# Patient Record
Sex: MALE | Race: WHITE | NOT HISPANIC OR LATINO | URBAN - METROPOLITAN AREA
[De-identification: names, ages, dates, MRNs, and addresses within clinical notes are randomized per-mention and may not be internally consistent; named-entity substitution may affect disease eponyms.]

---

## 2023-05-15 ENCOUNTER — OFFICE VISIT (OUTPATIENT)
Dept: PRIMARY CARE | Facility: CLINIC | Age: 63
End: 2023-05-15
Payer: MEDICARE

## 2023-05-15 VITALS
RESPIRATION RATE: 16 BRPM | SYSTOLIC BLOOD PRESSURE: 110 MMHG | HEIGHT: 68 IN | TEMPERATURE: 98.2 F | BODY MASS INDEX: 34.4 KG/M2 | DIASTOLIC BLOOD PRESSURE: 73 MMHG | OXYGEN SATURATION: 91 % | HEART RATE: 97 BPM | WEIGHT: 227 LBS

## 2023-05-15 DIAGNOSIS — E53.8 VITAMIN B12 DEFICIENCY: ICD-10-CM

## 2023-05-15 DIAGNOSIS — E55.9 VITAMIN D DEFICIENCY: ICD-10-CM

## 2023-05-15 DIAGNOSIS — E66.9 OBESITY (BMI 30.0-34.9): ICD-10-CM

## 2023-05-15 DIAGNOSIS — N40.0 BENIGN PROSTATIC HYPERPLASIA, UNSPECIFIED WHETHER LOWER URINARY TRACT SYMPTOMS PRESENT: ICD-10-CM

## 2023-05-15 DIAGNOSIS — R10.31 RLQ ABDOMINAL PAIN: Primary | ICD-10-CM

## 2023-05-15 DIAGNOSIS — Z12.12 ENCOUNTER FOR COLORECTAL CANCER SCREENING: ICD-10-CM

## 2023-05-15 DIAGNOSIS — E78.6 LOW HDL (UNDER 40): ICD-10-CM

## 2023-05-15 DIAGNOSIS — Z12.11 ENCOUNTER FOR COLORECTAL CANCER SCREENING: ICD-10-CM

## 2023-05-15 DIAGNOSIS — R19.7 INTERMITTENT DIARRHEA: ICD-10-CM

## 2023-05-15 PROBLEM — E66.811 OBESITY (BMI 30.0-34.9): Status: ACTIVE | Noted: 2023-05-15

## 2023-05-15 PROCEDURE — 99204 OFFICE O/P NEW MOD 45 MIN: CPT | Performed by: FAMILY MEDICINE

## 2023-05-15 ASSESSMENT — ENCOUNTER SYMPTOMS
DIARRHEA: 1
OCCASIONAL FEELINGS OF UNSTEADINESS: 0
ABDOMINAL PAIN: 1
DEPRESSION: 0
LOSS OF SENSATION IN FEET: 0

## 2023-05-15 ASSESSMENT — ACTIVITIES OF DAILY LIVING (ADL)
DOING_HOUSEWORK: INDEPENDENT
MANAGING_FINANCES: INDEPENDENT
GROCERY_SHOPPING: INDEPENDENT
DRESSING: INDEPENDENT
BATHING: INDEPENDENT
TAKING_MEDICATION: INDEPENDENT

## 2023-05-15 ASSESSMENT — PATIENT HEALTH QUESTIONNAIRE - PHQ9
SUM OF ALL RESPONSES TO PHQ9 QUESTIONS 1 AND 2: 0
1. LITTLE INTEREST OR PLEASURE IN DOING THINGS: NOT AT ALL
2. FEELING DOWN, DEPRESSED OR HOPELESS: NOT AT ALL

## 2023-05-15 NOTE — PROGRESS NOTES
Subjective   Reason for Visit: Marcin Prescott is an 63 y.o. male here for a Medicare Wellness visit.          Reviewed all medications by prescribing practitioner or clinical pharmacist (such as prescriptions, OTCs, herbal therapies and supplements) and documented in the medical record.    HPI    Patient Self Assessment of Health Status  Patient Self Assessment: Good    Nutrition and Exercise  Current Diet: Well Balanced Diet  Adequate Fluid Intake: Yes  Caffeine: Yes  Exercise Frequency: Infrequently              No care team member to display     Review of Systems    Objective   Vitals:  /73   Pulse 97   Temp 36.8 °C (98.2 °F)   Resp 16   Wt 103 kg (227 lb)   SpO2 91%       Physical Exam    Assessment/Plan   Problem List Items Addressed This Visit    None

## 2023-05-15 NOTE — PROGRESS NOTES
Subjective   Patient ID: Marcin Prescott is a 63 y.o. male who presents for Abdominal Pain (Started yesterday and kept him up all night. ) and Diarrhea (Intermittent the last few months).    Abdominal Pain  Associated symptoms include diarrhea.   Diarrhea   Associated symptoms include abdominal pain.   Patient comes in today as a returning patient to the practice.  He has not been here in 5 years.  He comes in today complaining of intermittent diarrhea for the past few months and abdominal pain for 1 day.  He has also been constipated on and off every 1 to 2 days.  He states yesterday he had 2 bowel movements and one was like diarrhea.  This morning he had a bowel movement that was normal.  He denies blood in the stool.  Currently he is complaining of right lower quadrant pain which was sharper yesterday and last night but improved this morning after bowel movement.    He did have a colonoscopy 3/2/2020 by Dr. Jamey Cotto here in our building just before COVID.  He was supposed to have a repeat in 1 year due to multiple polyps, diverticulosis and nonbleeding internal hemorrhoids.  He never had it done because of COVID.    3/7/2018  Patient comes in today for checkup and refill of meds. He currently is without complaints. He states that he is taking his medicines as directed and is not having any side effects from them. He did have some issues when he was eating grapefruit and taking the statins with muscle aches. Since he stopped grapefruit he is doing fine.         9/18/2017  Patient comes in today complaining of urinary symptoms since Friday. He woke up Friday morning with trouble urinating. He had blood in his urine, urgency and frequency the rest the day on Friday and Saturday. Sunday it started to improve slightly. He comes in today for further evaluation. This is the first time he has had these symptoms ever.     2/2/2017  Patient comes in today for a physical exam. He hasn't been here since April 2009. 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 He is single and has no children. He has never been .    Family history: Dad  62-year-old diabetes and heart attack. Mom  stomach cancer 77-year-old. Patient has 4 brothers alive and well one with DM. Patient's brother Ion  liver cancer 53 yo.    Patient gets up once a night to urinate and denies blood in his stool or urine. He has never had a colonoscopy. His last lab work was \"16-17 years ago\".    Patient has had a \"rash on my tailbone for 6-8 months\". He hasn't tried anything to get rid of it.     Review of Systems   Gastrointestinal:  Positive for abdominal pain and diarrhea.   All other systems reviewed and are negative.      Objective   /73   Pulse 97   Temp 36.8 °C (98.2 °F)   Resp 16   Wt 103 kg (227 lb)   SpO2 91%     Physical Exam  Vitals reviewed.   Constitutional:       Appearance: He is well-developed. He is obese.   HENT:      Head: Normocephalic and atraumatic.      Right Ear: Tympanic membrane, ear canal and external ear normal.      Left Ear: Tympanic membrane, ear canal and external ear normal.      Nose: Nose normal.      Mouth/Throat:      Mouth: Mucous membranes are moist.      Pharynx: Oropharynx is clear.   Eyes:      Extraocular Movements: Extraocular movements intact.      Conjunctiva/sclera: Conjunctivae normal.      Pupils: Pupils are equal, round, and reactive to light.   Cardiovascular:      Rate and Rhythm: Normal rate and regular rhythm.      Heart sounds: Normal heart sounds. No murmur heard.  Pulmonary:      Effort: Pulmonary effort is normal.      Breath sounds: Normal breath sounds. No wheezing.   Abdominal:      General: Abdomen is flat. Bowel sounds are normal.      Palpations: Abdomen is soft.      Tenderness: There is abdominal tenderness (Mild tenderness right lower quadrant but patient is hungry).   Musculoskeletal:         General: Normal range of motion.   Skin:     General: Skin is warm and dry. "   Neurological:      General: No focal deficit present.      Mental Status: He is alert and oriented to person, place, and time. Mental status is at baseline.   Psychiatric:         Mood and Affect: Mood normal.         Behavior: Behavior normal.         Thought Content: Thought content normal.         Judgment: Judgment normal.         Assessment/Plan   Problem List Items Addressed This Visit       Obesity (BMI 30.0-34.9)    RLQ abdominal pain - Primary    Intermittent diarrhea    Relevant Orders    Comprehensive Metabolic Panel (Completed)     Other Visit Diagnoses       Encounter for colorectal cancer screening        Relevant Orders    Colonoscopy    Vitamin B12 deficiency        Relevant Orders    CBC (Completed)    Vitamin B12 (Completed)    Vitamin D deficiency        Relevant Orders    Vitamin D, Total (Completed)    Low HDL (under 40)        Relevant Orders    Lipid Panel (Completed)    Benign prostatic hyperplasia, unspecified whether lower urinary tract symptoms present        Relevant Orders    Prostate Specific Antigen (Completed)        Will contact patient with lab results when available.  Referral to GI for colonoscopy  Medication list reconciled.  Thank you for visiting today!      Professional services: Some of this note was completed using Dragon voice technology and sometimes the software misinterprets words. This may include unintended errors with respect to translation of words, typographical errors or grammar errors which may not have been identified prior to finalization of the chart note. Please take this into account when reading the note. Thank you.

## 2023-05-16 ENCOUNTER — LAB (OUTPATIENT)
Dept: LAB | Facility: LAB | Age: 63
End: 2023-05-16
Payer: MEDICARE

## 2023-05-16 DIAGNOSIS — E55.9 VITAMIN D DEFICIENCY: ICD-10-CM

## 2023-05-16 DIAGNOSIS — E53.8 VITAMIN B12 DEFICIENCY: ICD-10-CM

## 2023-05-16 DIAGNOSIS — N40.0 BENIGN PROSTATIC HYPERPLASIA, UNSPECIFIED WHETHER LOWER URINARY TRACT SYMPTOMS PRESENT: ICD-10-CM

## 2023-05-16 DIAGNOSIS — E78.6 LOW HDL (UNDER 40): ICD-10-CM

## 2023-05-16 DIAGNOSIS — R19.7 INTERMITTENT DIARRHEA: ICD-10-CM

## 2023-05-16 LAB
ALANINE AMINOTRANSFERASE (SGPT) (U/L) IN SER/PLAS: 23 U/L (ref 10–52)
ALBUMIN (G/DL) IN SER/PLAS: 4.2 G/DL (ref 3.4–5)
ALKALINE PHOSPHATASE (U/L) IN SER/PLAS: 78 U/L (ref 33–136)
ANION GAP IN SER/PLAS: 7 MMOL/L (ref 10–20)
ASPARTATE AMINOTRANSFERASE (SGOT) (U/L) IN SER/PLAS: 16 U/L (ref 9–39)
BILIRUBIN TOTAL (MG/DL) IN SER/PLAS: 0.9 MG/DL (ref 0–1.2)
CALCIDIOL (25 OH VITAMIN D3) (NG/ML) IN SER/PLAS: 13 NG/ML
CALCIUM (MG/DL) IN SER/PLAS: 9.5 MG/DL (ref 8.6–10.3)
CARBON DIOXIDE, TOTAL (MMOL/L) IN SER/PLAS: 26 MMOL/L (ref 21–32)
CHLORIDE (MMOL/L) IN SER/PLAS: 110 MMOL/L (ref 98–107)
CHOLESTEROL (MG/DL) IN SER/PLAS: 184 MG/DL (ref 0–199)
CHOLESTEROL IN HDL (MG/DL) IN SER/PLAS: 43 MG/DL
CHOLESTEROL/HDL RATIO: 4.3
COBALAMIN (VITAMIN B12) (PG/ML) IN SER/PLAS: 216 PG/ML (ref 211–911)
CREATININE (MG/DL) IN SER/PLAS: 1.03 MG/DL (ref 0.5–1.3)
ERYTHROCYTE DISTRIBUTION WIDTH (RATIO) BY AUTOMATED COUNT: 13.1 % (ref 11.5–14.5)
ERYTHROCYTE MEAN CORPUSCULAR HEMOGLOBIN CONCENTRATION (G/DL) BY AUTOMATED: 37.1 G/DL (ref 32–36)
ERYTHROCYTE MEAN CORPUSCULAR VOLUME (FL) BY AUTOMATED COUNT: 91 FL (ref 80–100)
ERYTHROCYTES (10*6/UL) IN BLOOD BY AUTOMATED COUNT: 4.87 X10E12/L (ref 4.5–5.9)
GFR MALE: 81 ML/MIN/1.73M2
GLUCOSE (MG/DL) IN SER/PLAS: 116 MG/DL (ref 74–99)
HEMATOCRIT (%) IN BLOOD BY AUTOMATED COUNT: 44.2 % (ref 41–52)
HEMOGLOBIN (G/DL) IN BLOOD: 16.4 G/DL (ref 13.5–17.5)
LDL: 114 MG/DL (ref 0–99)
LEUKOCYTES (10*3/UL) IN BLOOD BY AUTOMATED COUNT: 9.1 X10E9/L (ref 4.4–11.3)
PLATELETS (10*3/UL) IN BLOOD AUTOMATED COUNT: 235 X10E9/L (ref 150–450)
POTASSIUM (MMOL/L) IN SER/PLAS: 4.5 MMOL/L (ref 3.5–5.3)
PROSTATE SPECIFIC AG (NG/ML) IN SER/PLAS: 0.35 NG/ML (ref 0–4)
PROTEIN TOTAL: 7 G/DL (ref 6.4–8.2)
SODIUM (MMOL/L) IN SER/PLAS: 138 MMOL/L (ref 136–145)
TRIGLYCERIDE (MG/DL) IN SER/PLAS: 134 MG/DL (ref 0–149)
UREA NITROGEN (MG/DL) IN SER/PLAS: 21 MG/DL (ref 6–23)
VLDL: 27 MG/DL (ref 0–40)

## 2023-05-16 PROCEDURE — 80061 LIPID PANEL: CPT

## 2023-05-16 PROCEDURE — 82306 VITAMIN D 25 HYDROXY: CPT

## 2023-05-16 PROCEDURE — 85027 COMPLETE CBC AUTOMATED: CPT

## 2023-05-16 PROCEDURE — 84153 ASSAY OF PSA TOTAL: CPT

## 2023-05-16 PROCEDURE — 82607 VITAMIN B-12: CPT

## 2023-05-16 PROCEDURE — 36415 COLL VENOUS BLD VENIPUNCTURE: CPT

## 2023-05-16 PROCEDURE — 80053 COMPREHEN METABOLIC PANEL: CPT

## 2023-05-24 ENCOUNTER — TELEPHONE (OUTPATIENT)
Dept: PRIMARY CARE | Facility: CLINIC | Age: 63
End: 2023-05-24
Payer: MEDICARE

## 2023-05-24 DIAGNOSIS — E55.9 VITAMIN D DEFICIENCY: Primary | ICD-10-CM

## 2023-05-24 RX ORDER — ERGOCALCIFEROL 1.25 MG/1
CAPSULE ORAL
Qty: 16 CAPSULE | Refills: 2 | Status: SHIPPED | OUTPATIENT
Start: 2023-05-24 | End: 2023-06-20

## 2023-05-24 NOTE — TELEPHONE ENCOUNTER
----- Message from Yovanny Phan DO sent at 5/24/2023  7:10 AM EDT -----  Regarding: Labs  Please notify patient of very low vitamin D of 13.  It should be between 30 and 100 the closer to 50 the better. It is important vitamin for your heart, lungs, immune system and bones among other things in your body. Would recommend high potency vitamin D 50,000 units 4 times a week for the next 12 weeks and recheck in September 2023. Prescription sent to [facility] pharmacy in [place].    Cholesterol panel is trending upward.  Last cholesterol HDL ratio was 3.5 and now it is 4.3.  Any ratio 5.0 or higher is considered high risk for heart disease and stroke, ratio of 3.4 is ideal. Would recommend closely watching cholesterol in the diet and will recheck in September 2023.    Patient's B12 is also low at 216.  It should be above 400, when less than 400 one may have both neurological and/or psychological symptoms. Would recommend B-12 shots monthly for 4 months and recheck in September 2023. These can be self injected at home or set up in our office as a nurse visit.    All the rest of the labs are good, recheck in 1 year.  ----- Message -----  From: Lab, Background User  Sent: 5/16/2023  12:14 PM EDT  To: Yovanny Phan DO

## 2023-06-18 DIAGNOSIS — E55.9 VITAMIN D DEFICIENCY: ICD-10-CM

## 2023-06-20 RX ORDER — ERGOCALCIFEROL 1.25 MG/1
CAPSULE ORAL
Qty: 16 CAPSULE | Refills: 2 | Status: SHIPPED | OUTPATIENT
Start: 2023-06-20 | End: 2023-07-18

## 2023-07-17 DIAGNOSIS — E55.9 VITAMIN D DEFICIENCY: ICD-10-CM

## 2023-07-17 NOTE — TELEPHONE ENCOUNTER
Requested Prescriptions     Pending Prescriptions Disp Refills    ergocalciferol (Vitamin D-2) 1.25 MG (14825 UT) capsule [Pharmacy Med Name: VITAMIN D2 1.25MG(50,000 UNIT)] 16 capsule 2     Sig: TAKE 1 CAPSULE BY MOUTH 4 TIMES A WEEK FOR 12 WEEKS. NOT COVERED

## 2023-07-18 RX ORDER — ERGOCALCIFEROL 1.25 MG/1
CAPSULE ORAL
Qty: 16 CAPSULE | Refills: 2 | Status: SHIPPED | OUTPATIENT
Start: 2023-07-18

## 2024-05-09 ENCOUNTER — LAB (OUTPATIENT)
Dept: LAB | Facility: LAB | Age: 64
End: 2024-05-09
Payer: MEDICARE

## 2024-05-09 ENCOUNTER — OFFICE VISIT (OUTPATIENT)
Dept: PRIMARY CARE | Facility: CLINIC | Age: 64
End: 2024-05-09
Payer: MEDICARE

## 2024-05-09 VITALS
SYSTOLIC BLOOD PRESSURE: 140 MMHG | TEMPERATURE: 97.9 F | OXYGEN SATURATION: 97 % | RESPIRATION RATE: 20 BRPM | BODY MASS INDEX: 35.67 KG/M2 | WEIGHT: 234 LBS | HEART RATE: 68 BPM | DIASTOLIC BLOOD PRESSURE: 88 MMHG

## 2024-05-09 DIAGNOSIS — M25.472 LEFT ANKLE SWELLING: ICD-10-CM

## 2024-05-09 DIAGNOSIS — E78.6 LOW HDL (UNDER 40): ICD-10-CM

## 2024-05-09 DIAGNOSIS — E55.9 VITAMIN D DEFICIENCY: ICD-10-CM

## 2024-05-09 DIAGNOSIS — Z00.00 ENCOUNTER FOR HEALTH MAINTENANCE EXAMINATION: ICD-10-CM

## 2024-05-09 DIAGNOSIS — M79.89 SWELLING OF LEFT FOOT: ICD-10-CM

## 2024-05-09 DIAGNOSIS — Z83.3 FAMILY HISTORY OF DIABETES MELLITUS (DM): ICD-10-CM

## 2024-05-09 DIAGNOSIS — E53.8 VITAMIN B12 DEFICIENCY: ICD-10-CM

## 2024-05-09 DIAGNOSIS — N40.0 BENIGN PROSTATIC HYPERPLASIA, UNSPECIFIED WHETHER LOWER URINARY TRACT SYMPTOMS PRESENT: ICD-10-CM

## 2024-05-09 DIAGNOSIS — Z00.00 ENCOUNTER FOR HEALTH MAINTENANCE EXAMINATION: Primary | ICD-10-CM

## 2024-05-09 LAB
25(OH)D3 SERPL-MCNC: 27 NG/ML (ref 30–100)
ALBUMIN SERPL BCP-MCNC: 4.3 G/DL (ref 3.4–5)
ALP SERPL-CCNC: 74 U/L (ref 33–136)
ALT SERPL W P-5'-P-CCNC: 35 U/L (ref 10–52)
ANION GAP SERPL CALC-SCNC: 11 MMOL/L (ref 10–20)
AST SERPL W P-5'-P-CCNC: 24 U/L (ref 9–39)
BILIRUB SERPL-MCNC: 0.8 MG/DL (ref 0–1.2)
BUN SERPL-MCNC: 17 MG/DL (ref 6–23)
CALCIUM SERPL-MCNC: 9.4 MG/DL (ref 8.6–10.3)
CHLORIDE SERPL-SCNC: 108 MMOL/L (ref 98–107)
CHOLEST SERPL-MCNC: 192 MG/DL (ref 0–199)
CHOLESTEROL/HDL RATIO: 5.3
CO2 SERPL-SCNC: 25 MMOL/L (ref 21–32)
CREAT SERPL-MCNC: 0.92 MG/DL (ref 0.5–1.3)
EGFRCR SERPLBLD CKD-EPI 2021: >90 ML/MIN/1.73M*2
ERYTHROCYTE [DISTWIDTH] IN BLOOD BY AUTOMATED COUNT: 13.3 % (ref 11.5–14.5)
EST. AVERAGE GLUCOSE BLD GHB EST-MCNC: 114 MG/DL
GLUCOSE SERPL-MCNC: 107 MG/DL (ref 74–99)
HBA1C MFR BLD: 5.6 %
HCT VFR BLD AUTO: 46.7 % (ref 41–52)
HDLC SERPL-MCNC: 36.3 MG/DL
HGB BLD-MCNC: 15.7 G/DL (ref 13.5–17.5)
LDLC SERPL CALC-MCNC: 111 MG/DL
MCH RBC QN AUTO: 31.2 PG (ref 26–34)
MCHC RBC AUTO-ENTMCNC: 33.6 G/DL (ref 32–36)
MCV RBC AUTO: 93 FL (ref 80–100)
NON HDL CHOLESTEROL: 156 MG/DL (ref 0–149)
NRBC BLD-RTO: 0 /100 WBCS (ref 0–0)
PLATELET # BLD AUTO: 260 X10*3/UL (ref 150–450)
POTASSIUM SERPL-SCNC: 4.3 MMOL/L (ref 3.5–5.3)
PROT SERPL-MCNC: 7.1 G/DL (ref 6.4–8.2)
PSA SERPL-MCNC: 0.52 NG/ML
RBC # BLD AUTO: 5.04 X10*6/UL (ref 4.5–5.9)
SODIUM SERPL-SCNC: 140 MMOL/L (ref 136–145)
TRIGL SERPL-MCNC: 223 MG/DL (ref 0–149)
VIT B12 SERPL-MCNC: 305 PG/ML (ref 211–911)
VLDL: 45 MG/DL (ref 0–40)
WBC # BLD AUTO: 7.2 X10*3/UL (ref 4.4–11.3)

## 2024-05-09 PROCEDURE — 1170F FXNL STATUS ASSESSED: CPT | Performed by: FAMILY MEDICINE

## 2024-05-09 PROCEDURE — G0438 PPPS, INITIAL VISIT: HCPCS | Performed by: FAMILY MEDICINE

## 2024-05-09 PROCEDURE — 83036 HEMOGLOBIN GLYCOSYLATED A1C: CPT

## 2024-05-09 PROCEDURE — 1159F MED LIST DOCD IN RCRD: CPT | Performed by: FAMILY MEDICINE

## 2024-05-09 PROCEDURE — 80053 COMPREHEN METABOLIC PANEL: CPT

## 2024-05-09 PROCEDURE — 82607 VITAMIN B-12: CPT

## 2024-05-09 PROCEDURE — 85027 COMPLETE CBC AUTOMATED: CPT

## 2024-05-09 PROCEDURE — 1036F TOBACCO NON-USER: CPT | Performed by: FAMILY MEDICINE

## 2024-05-09 PROCEDURE — 36415 COLL VENOUS BLD VENIPUNCTURE: CPT

## 2024-05-09 PROCEDURE — 80061 LIPID PANEL: CPT

## 2024-05-09 PROCEDURE — 82306 VITAMIN D 25 HYDROXY: CPT

## 2024-05-09 PROCEDURE — 99214 OFFICE O/P EST MOD 30 MIN: CPT | Performed by: FAMILY MEDICINE

## 2024-05-09 PROCEDURE — 84153 ASSAY OF PSA TOTAL: CPT

## 2024-05-09 RX ORDER — CHOLECALCIFEROL (VITAMIN D3) 25 MCG
1000 TABLET ORAL DAILY
COMMUNITY

## 2024-05-09 ASSESSMENT — ACTIVITIES OF DAILY LIVING (ADL)
GROCERY_SHOPPING: INDEPENDENT
MANAGING_FINANCES: INDEPENDENT
TAKING_MEDICATION: INDEPENDENT
DOING_HOUSEWORK: INDEPENDENT
DRESSING: INDEPENDENT
BATHING: INDEPENDENT

## 2024-05-09 ASSESSMENT — PATIENT HEALTH QUESTIONNAIRE - PHQ9
1. LITTLE INTEREST OR PLEASURE IN DOING THINGS: SEVERAL DAYS
SUM OF ALL RESPONSES TO PHQ9 QUESTIONS 1 AND 2: 1
2. FEELING DOWN, DEPRESSED OR HOPELESS: NOT AT ALL
10. IF YOU CHECKED OFF ANY PROBLEMS, HOW DIFFICULT HAVE THESE PROBLEMS MADE IT FOR YOU TO DO YOUR WORK, TAKE CARE OF THINGS AT HOME, OR GET ALONG WITH OTHER PEOPLE: NOT DIFFICULT AT ALL

## 2024-05-09 ASSESSMENT — ENCOUNTER SYMPTOMS
LOSS OF SENSATION IN FEET: 0
DEPRESSION: 0
OCCASIONAL FEELINGS OF UNSTEADINESS: 0

## 2024-05-09 NOTE — PROGRESS NOTES
"Subjective   Reason for Visit: Marcin Prescott is an 64 y.o. male here for a Medicare Wellness visit.   Reviewed all medications by prescribing practitioner or clinical pharmacist (such as prescriptions, OTCs, herbal therapies and supplements) and documented in the medical record.    HPI  Patient comes in today for Medicare wellness exam.  He has a couple of issues to discuss.  He has been noticing some ankle swelling every day.  It seems to come by the end of the day but then is gone the next morning when he wakes up.  He did injure his left knee lifting about a month ago.  Today his left knee is good.    Patient also would like a \"black spot\" on his back evaluated.    Patient is still working, he is an automotive .    Patient's PHQ-9 score today was 2 which he rated not difficult at all.    Patient's ASCVD risk score today is 16.3%    Cardiovascular risks discussed and, if needed, lifestyle modifications recommended, including nutritional choices, exercise, and elimination of habits contributing to risk.  We agreed on a plan to reduce the current cardiovascular risk.     Aspirin use/disuse was discussed and documented in the Problem List of the medical record (under Cardiac Risk Counseling) after reviewing the updated guidelines below:   Consider low dose Aspirin ( mg) use if the benefit for cardiovascular disease prevention outweighs risk for bleeding complications.    In general, low dose ASA should be considered:   In patients WITHOUT prior MI/stroke/PAD (primary prevention):    a.          Age <60: Use if 10-year cardiovascular disease risk >20%, with                                       discussion of risks and benefits with patient   b.          Age 60-<70: Use if 10-year cardiovascular disease risk >20% and                                    low bleeding (e.g., gastrointestinal) risk, with discussion of risks                                  and benefits with patient   c.          " Age >=70: Do not use      In patients WITH prior MI/stroke/PAD (secondary prevention):    Generally use unless extremely high bleeding (e.g., gastrointestinal) risk,                    with discussion of risks and benefits with patient    Patient Care Team:  Yovanny Phan DO as PCP - General (Family Medicine)  Yovanny Phan DO as PCP [facility]     Review of Systems   All other systems reviewed and are negative.      Objective   Vitals:  /88   Pulse 68   Temp 36.6 °C (97.9 °F)   Resp 20   Wt 106 kg (234 lb)   SpO2 97%   BMI 35.67 kg/m²       Physical Exam  Vitals reviewed.   Constitutional:       Appearance: He is well-developed. He is obese.   HENT:      Head: Normocephalic and atraumatic.      Right Ear: Tympanic membrane, ear canal and external ear normal.      Left Ear: Tympanic membrane, ear canal and external ear normal.      Nose: Nose normal.      Mouth/Throat:      Mouth: Mucous membranes are moist.      Pharynx: Oropharynx is clear.   Eyes:      Extraocular Movements: Extraocular movements intact.      Conjunctiva/sclera: Conjunctivae normal.      Pupils: Pupils are equal, round, and reactive to light.   Cardiovascular:      Rate and Rhythm: Normal rate and regular rhythm.      Heart sounds: Normal heart sounds. No murmur heard.  Pulmonary:      Effort: Pulmonary effort is normal.      Breath sounds: Normal breath sounds. No wheezing.   Abdominal:      General: Abdomen is flat. Bowel sounds are normal.      Palpations: Abdomen is soft.   Musculoskeletal:         General: Normal range of motion.   Skin:     General: Skin is warm and dry.   Neurological:      General: No focal deficit present.      Mental Status: He is alert and oriented to person, place, and time. Mental status is at baseline.   Psychiatric:         Mood and Affect: Mood normal.         Behavior: Behavior normal.         Thought Content: Thought content normal.         Judgment: Judgment normal.        Assessment/Plan   Problem List Items Addressed This Visit    None  Visit Diagnoses       Low HDL (under 40)    -  Primary    Relevant Orders    Lipid Panel    Vitamin D deficiency        Relevant Orders    Vitamin D 25-Hydroxy,Total (for eval of Vitamin D levels)    Vitamin B12 deficiency        Relevant Orders    CBC    Vitamin B12    Benign prostatic hyperplasia, unspecified whether lower urinary tract symptoms present        Relevant Orders    Prostate Specific Antigen    Encounter for health maintenance examination        Relevant Orders    Comprehensive Metabolic Panel    Family history of diabetes mellitus (DM)        Relevant Orders    Hemoglobin A1C        Will contact patient with lab results when available.  Recommend trial of knee-high support hose.  Medication list reconciled.  Thank you for visiting today!      Professional services: Some of this note was completed using Dragon voice technology and sometimes the software misinterprets words. This may include unintended errors with respect to translation of words, typographical errors or grammar errors which may not have been identified prior to finalization of the chart note. Please take this into account when reading the note. Thank you.

## 2024-05-10 NOTE — RESULT ENCOUNTER NOTE
Chao Burch,    Your vitamin D level was low at 27.  It should be between 30 and 100 the closer to 50 the better. It is an important vitamin for your heart, lungs, immune system and bones among other things in your body. Would recommend over the counter vitamin D3 2000 units daily to get it into and keep it in the normal range and recheck in September 2024.     Your cholesterol/HDL ratio has gone up from 4.3 a year ago to 5.3 now.  Any ratio 5.0 or higher is considered high risk for heart disease and stroke, ratio of 3.4 is ideal. Would recommend closely watching cholesterol and fats in the diet and will recheck in September 2024. If not improved at that time we may need to start medication.    Your B12 level was low normal at 305.  It should be above 400, when less than 400 one may have both neurological and/or psychological symptoms. Would recommend B-12 shots monthly for 3 months and recheck in September 2024. These can be self injected at home or set up in our office as a nurse visit. Let me know which you would like to do.    All the rest of your labs were good.  Recheck them again in 1 year.    Have a Great Day and Weekend!!!    Dr. Phan

## 2024-06-28 ENCOUNTER — APPOINTMENT (OUTPATIENT)
Dept: PRIMARY CARE | Facility: CLINIC | Age: 64
End: 2024-06-28
Payer: MEDICARE

## 2024-06-28 VITALS
TEMPERATURE: 98 F | WEIGHT: 231 LBS | SYSTOLIC BLOOD PRESSURE: 130 MMHG | HEART RATE: 67 BPM | DIASTOLIC BLOOD PRESSURE: 77 MMHG | RESPIRATION RATE: 16 BRPM | BODY MASS INDEX: 35.21 KG/M2

## 2024-06-28 DIAGNOSIS — E55.9 VITAMIN D DEFICIENCY: ICD-10-CM

## 2024-06-28 DIAGNOSIS — E53.8 VITAMIN B12 DEFICIENCY: Primary | ICD-10-CM

## 2024-06-28 DIAGNOSIS — E78.6 LOW HDL (UNDER 40): ICD-10-CM

## 2024-06-28 PROCEDURE — 99213 OFFICE O/P EST LOW 20 MIN: CPT | Performed by: FAMILY MEDICINE

## 2024-06-28 PROCEDURE — 96372 THER/PROPH/DIAG INJ SC/IM: CPT | Performed by: FAMILY MEDICINE

## 2024-06-28 PROCEDURE — 1036F TOBACCO NON-USER: CPT | Performed by: FAMILY MEDICINE

## 2024-06-28 PROCEDURE — 1159F MED LIST DOCD IN RCRD: CPT | Performed by: FAMILY MEDICINE

## 2024-06-28 RX ORDER — CYANOCOBALAMIN 1000 UG/ML
1000 INJECTION, SOLUTION INTRAMUSCULAR; SUBCUTANEOUS ONCE
Status: COMPLETED | OUTPATIENT
Start: 2024-06-28 | End: 2024-06-28

## 2024-06-28 NOTE — PROGRESS NOTES
Subjective   Patient ID: Marcin Prescott is a 64 y.o. male who presents for B12 Injection (Discuss vitamin b12 injections).  HPI  Patient presents today for 1-month checkup and refill of meds. He currently is without complaints. He states that he is taking his medicines as directed and is not having any side effects from them.    He had lab work done last month which showed deficiencies in his vitamin D, B12 and also abnormal lipid panel.  He states he is taking the vitamin D replacement and has been watching the cholesterol and fats in his diet.  He is here today for his first of 3 B12 shots.    Review of Systems   All other systems reviewed and are negative.      Objective   Vitals:  /77   Pulse 67   Temp 36.7 °C (98 °F)   Resp 16   Wt 105 kg (231 lb)   BMI 35.21 kg/m²     Physical Exam  Vitals reviewed.   Constitutional:       Appearance: He is well-developed.   HENT:      Head: Normocephalic and atraumatic.      Right Ear: Tympanic membrane, ear canal and external ear normal.      Left Ear: Tympanic membrane, ear canal and external ear normal.      Nose: Nose normal.      Mouth/Throat:      Mouth: Mucous membranes are moist.      Pharynx: Oropharynx is clear.   Eyes:      Extraocular Movements: Extraocular movements intact.      Conjunctiva/sclera: Conjunctivae normal.      Pupils: Pupils are equal, round, and reactive to light.   Cardiovascular:      Rate and Rhythm: Normal rate and regular rhythm.      Heart sounds: Normal heart sounds. No murmur heard.  Pulmonary:      Effort: Pulmonary effort is normal.      Breath sounds: Normal breath sounds. No wheezing.   Abdominal:      General: Abdomen is flat. Bowel sounds are normal.      Palpations: Abdomen is soft.   Musculoskeletal:         General: Normal range of motion.   Skin:     General: Skin is warm and dry.   Neurological:      General: No focal deficit present.      Mental Status: He is alert and oriented to person, place, and time. Mental status  is at baseline.   Psychiatric:         Mood and Affect: Mood normal.         Behavior: Behavior normal.         Thought Content: Thought content normal.         Judgment: Judgment normal.       Assessment/Plan   Problem List Items Addressed This Visit    None  Visit Diagnoses       Vitamin B12 deficiency    -  Primary    Relevant Medications    cyanocobalamin (Vitamin B-12) injection 1,000 mcg (Start on 6/28/2024  9:00 AM)    Vitamin D deficiency        Low HDL (under 40)            Return to clinic monthly for B12 shots for 2 more months.  Recheck labs in September  Medication list reconciled.  Thank you for visiting today!      Professional services: Some of this note was completed using Dragon voice technology and sometimes the software misinterprets words. This may include unintended errors with respect to translation of words, typographical errors or grammar errors which may not have been identified prior to finalization of the chart note. Please take this into account when reading the note. Thank you.

## 2024-07-29 ENCOUNTER — APPOINTMENT (OUTPATIENT)
Dept: PRIMARY CARE | Facility: CLINIC | Age: 64
End: 2024-07-29
Payer: MEDICARE

## 2024-07-29 DIAGNOSIS — E53.8 VITAMIN B12 DEFICIENCY: ICD-10-CM

## 2024-07-29 PROCEDURE — 96372 THER/PROPH/DIAG INJ SC/IM: CPT | Performed by: FAMILY MEDICINE

## 2024-07-29 RX ORDER — CYANOCOBALAMIN 1000 UG/ML
1000 INJECTION, SOLUTION INTRAMUSCULAR; SUBCUTANEOUS ONCE
Status: COMPLETED | OUTPATIENT
Start: 2024-07-29 | End: 2024-07-29

## 2024-07-29 NOTE — PROGRESS NOTES
Marcin Prescott presented in office for 2/3 b12 nurse visit. Pt tolerated well, no side effects. Overseeing provider was Dr. Phan  .

## 2024-08-29 ENCOUNTER — APPOINTMENT (OUTPATIENT)
Dept: PRIMARY CARE | Facility: CLINIC | Age: 64
End: 2024-08-29
Payer: MEDICARE

## 2024-08-29 DIAGNOSIS — E55.9 VITAMIN D DEFICIENCY: ICD-10-CM

## 2024-08-29 DIAGNOSIS — E53.8 VITAMIN B12 DEFICIENCY: ICD-10-CM

## 2024-08-29 DIAGNOSIS — E78.6 LOW HDL (UNDER 40): ICD-10-CM

## 2024-08-29 RX ORDER — CYANOCOBALAMIN 1000 UG/ML
1000 INJECTION, SOLUTION INTRAMUSCULAR; SUBCUTANEOUS ONCE
Status: SHIPPED | OUTPATIENT
Start: 2024-08-29

## 2024-08-29 NOTE — PROGRESS NOTES
Marcin Prescott presented in office for 3/3 b12 inj nurse visit. Pt tolerated well, no side effects. Overseeing provider was Dr. Phan    Labs ordered and Pt advised.   .

## 2024-10-01 ENCOUNTER — LAB (OUTPATIENT)
Dept: LAB | Facility: LAB | Age: 64
End: 2024-10-01
Payer: MEDICARE

## 2024-10-01 DIAGNOSIS — E53.8 VITAMIN B12 DEFICIENCY: ICD-10-CM

## 2024-10-01 DIAGNOSIS — E55.9 VITAMIN D DEFICIENCY: ICD-10-CM

## 2024-10-01 DIAGNOSIS — E78.6 LOW HDL (UNDER 40): ICD-10-CM

## 2024-10-01 LAB
25(OH)D3 SERPL-MCNC: 35 NG/ML (ref 30–100)
CHOLEST SERPL-MCNC: 193 MG/DL (ref 0–199)
CHOLESTEROL/HDL RATIO: 6
HDLC SERPL-MCNC: 32.3 MG/DL
LDLC SERPL CALC-MCNC: 111 MG/DL
NON HDL CHOLESTEROL: 161 MG/DL (ref 0–149)
TRIGL SERPL-MCNC: 251 MG/DL (ref 0–149)
VIT B12 SERPL-MCNC: 391 PG/ML (ref 211–911)
VLDL: 50 MG/DL (ref 0–40)

## 2024-10-01 PROCEDURE — 82306 VITAMIN D 25 HYDROXY: CPT

## 2024-10-01 PROCEDURE — 82607 VITAMIN B-12: CPT

## 2024-10-01 PROCEDURE — 80061 LIPID PANEL: CPT

## 2024-10-01 PROCEDURE — 36415 COLL VENOUS BLD VENIPUNCTURE: CPT

## 2024-10-07 DIAGNOSIS — E78.2 HYPERLIPIDEMIA, MIXED: Primary | ICD-10-CM

## 2024-10-07 DIAGNOSIS — Z00.00 HEALTH CARE MAINTENANCE: Primary | ICD-10-CM

## 2024-10-07 RX ORDER — ROSUVASTATIN CALCIUM 40 MG/1
40 TABLET, COATED ORAL NIGHTLY
Qty: 90 TABLET | Refills: 1 | Status: SHIPPED | OUTPATIENT
Start: 2024-10-07 | End: 2025-11-11

## 2024-11-30 ENCOUNTER — HOSPITAL ENCOUNTER (OUTPATIENT)
Dept: RADIOLOGY | Facility: HOSPITAL | Age: 64
Discharge: HOME | End: 2024-11-30

## 2024-11-30 DIAGNOSIS — Z00.00 HEALTH CARE MAINTENANCE: ICD-10-CM

## 2024-11-30 PROCEDURE — 75571 CT HRT W/O DYE W/CA TEST: CPT

## 2024-12-04 NOTE — RESULT ENCOUNTER NOTE
Chao Burch,     Sorry about the delay in getting back to you about your test results.  Hope you had a great Thanksgiving!  I was on vacation last week.    Your coronary artery calcium score was good at 42.31.  When a calcium score is between 1 and 99 this means that some very mild hardening of the arteries is present and the risk of a heart attack over the next 10 years is 4%. Would recommend continuing to use the Crestor 40 mg daily and watch cholesterol in the diet and try to exercise 3-5 times a week.    Have a Great Day and Happy Holidays!!!    Dr. Phan

## 2025-06-17 ENCOUNTER — TELEPHONE (OUTPATIENT)
Dept: PRIMARY CARE | Facility: CLINIC | Age: 65
End: 2025-06-17
Payer: MEDICARE

## 2025-06-17 DIAGNOSIS — E78.2 HYPERLIPIDEMIA, MIXED: ICD-10-CM

## 2025-06-17 RX ORDER — ROSUVASTATIN CALCIUM 40 MG/1
40 TABLET, COATED ORAL NIGHTLY
Qty: 30 TABLET | Refills: 0 | Status: SHIPPED | OUTPATIENT
Start: 2025-06-17 | End: 2025-07-17

## 2025-06-17 NOTE — TELEPHONE ENCOUNTER
Rx Refill Request Telephone Encounter    Name:  Marcin Prescott  :  [id number]  Medication Name:  rosuvastatin (Crestor) 40 mg tablet     Specific Pharmacy location:    [facility]/pharmacy [address] [place] [address] AT [facility] Phone: [phone]   Fax: [phone]        Date of last appointment:  24  Date of next appointment:  25  Best number to reach patient:  [phone]          Thank You

## 2025-07-11 DIAGNOSIS — E78.2 HYPERLIPIDEMIA, MIXED: ICD-10-CM

## 2025-07-11 RX ORDER — ROSUVASTATIN CALCIUM 40 MG/1
40 TABLET, COATED ORAL NIGHTLY
Qty: 90 TABLET | Refills: 0 | Status: SHIPPED | OUTPATIENT
Start: 2025-07-11 | End: 2025-08-10

## 2025-07-14 ENCOUNTER — APPOINTMENT (OUTPATIENT)
Dept: PRIMARY CARE | Facility: CLINIC | Age: 65
End: 2025-07-14
Payer: MEDICARE

## 2025-07-14 VITALS
WEIGHT: 241 LBS | BODY MASS INDEX: 36.53 KG/M2 | TEMPERATURE: 97.6 F | RESPIRATION RATE: 20 BRPM | OXYGEN SATURATION: 95 % | HEIGHT: 68 IN | DIASTOLIC BLOOD PRESSURE: 75 MMHG | HEART RATE: 76 BPM | SYSTOLIC BLOOD PRESSURE: 108 MMHG

## 2025-07-14 DIAGNOSIS — T46.6X5A STATIN MYOPATHY: ICD-10-CM

## 2025-07-14 DIAGNOSIS — R73.9 HYPERGLYCEMIA: ICD-10-CM

## 2025-07-14 DIAGNOSIS — N40.0 BENIGN PROSTATIC HYPERPLASIA, UNSPECIFIED WHETHER LOWER URINARY TRACT SYMPTOMS PRESENT: ICD-10-CM

## 2025-07-14 DIAGNOSIS — G72.0 STATIN MYOPATHY: ICD-10-CM

## 2025-07-14 DIAGNOSIS — E66.9 OBESITY (BMI 35.0-39.9 WITHOUT COMORBIDITY): ICD-10-CM

## 2025-07-14 DIAGNOSIS — E55.9 VITAMIN D DEFICIENCY: ICD-10-CM

## 2025-07-14 DIAGNOSIS — Z13.228 SCREENING FOR METABOLIC DISORDER: ICD-10-CM

## 2025-07-14 DIAGNOSIS — E78.2 HYPERLIPIDEMIA, MIXED: ICD-10-CM

## 2025-07-14 DIAGNOSIS — Z83.3 FAMILY HISTORY OF DIABETES MELLITUS (DM): ICD-10-CM

## 2025-07-14 DIAGNOSIS — R53.83 FATIGUE, UNSPECIFIED TYPE: ICD-10-CM

## 2025-07-14 DIAGNOSIS — E53.8 VITAMIN B12 DEFICIENCY: ICD-10-CM

## 2025-07-14 DIAGNOSIS — Z11.59 ENCOUNTER FOR HEPATITIS C SCREENING TEST FOR LOW RISK PATIENT: ICD-10-CM

## 2025-07-14 DIAGNOSIS — Z00.00 ROUTINE GENERAL MEDICAL EXAMINATION AT A HEALTH CARE FACILITY: Primary | ICD-10-CM

## 2025-07-14 PROCEDURE — 1123F ACP DISCUSS/DSCN MKR DOCD: CPT | Performed by: FAMILY MEDICINE

## 2025-07-14 PROCEDURE — 1158F ADVNC CARE PLAN TLK DOCD: CPT | Performed by: FAMILY MEDICINE

## 2025-07-14 PROCEDURE — 3008F BODY MASS INDEX DOCD: CPT | Performed by: FAMILY MEDICINE

## 2025-07-14 PROCEDURE — 1159F MED LIST DOCD IN RCRD: CPT | Performed by: FAMILY MEDICINE

## 2025-07-14 PROCEDURE — 1036F TOBACCO NON-USER: CPT | Performed by: FAMILY MEDICINE

## 2025-07-14 PROCEDURE — 1170F FXNL STATUS ASSESSED: CPT | Performed by: FAMILY MEDICINE

## 2025-07-14 PROCEDURE — G0439 PPPS, SUBSEQ VISIT: HCPCS | Performed by: FAMILY MEDICINE

## 2025-07-14 PROCEDURE — 99214 OFFICE O/P EST MOD 30 MIN: CPT | Performed by: FAMILY MEDICINE

## 2025-07-14 ASSESSMENT — ACTIVITIES OF DAILY LIVING (ADL)
DOING_HOUSEWORK: INDEPENDENT
BATHING: INDEPENDENT
DRESSING: INDEPENDENT
TAKING_MEDICATION: INDEPENDENT
MANAGING_FINANCES: INDEPENDENT
GROCERY_SHOPPING: INDEPENDENT

## 2025-07-14 ASSESSMENT — PATIENT HEALTH QUESTIONNAIRE - PHQ9
SUM OF ALL RESPONSES TO PHQ9 QUESTIONS 1 AND 2: 1
2. FEELING DOWN, DEPRESSED OR HOPELESS: NOT AT ALL
1. LITTLE INTEREST OR PLEASURE IN DOING THINGS: SEVERAL DAYS

## 2025-07-14 ASSESSMENT — ENCOUNTER SYMPTOMS
DEPRESSION: 0
OCCASIONAL FEELINGS OF UNSTEADINESS: 0
LOSS OF SENSATION IN FEET: 0

## 2025-07-14 NOTE — PROGRESS NOTES
"Subjective   Reason for Visit: Marcin Prescott is an 65 y.o. male here for a Medicare Wellness visit.     Past Medical, Surgical, and Family History reviewed and updated in chart.    Reviewed all medications by prescribing practitioner or clinical pharmacist (such as prescriptions, OTCs, herbal therapies and supplements) and documented in the medical record.    HPI  Patient presents today for 1 year checkup, Medicare wellness exam and refill of meds.  He states that he is taking his medicines as directed but he is having muscle aches with the Crestor.  He did stop it for a few weeks and noticed improvement in the muscle aches but has gone back on it since.  We talked about reducing the dose versus adding Co-Q10 versus changing the medicine but will await his lab results first before doing so.    Patient's colonoscopy is up-to-date    Patient has been diagnosed with possible glaucoma and is seeing another eye specialist for second opinion on 8/14/2025.    Patient's PHQ-9 score today is 3 which he describes is not difficult at all.    Patient Care Team:  Yovanny Phan DO as PCP - General (Family Medicine)  [facility] Phan DO [facility]     Review of Systems   All other systems reviewed and are negative.      Objective   Vitals:  /75   Pulse 76   Temp 36.4 °C (97.6 °F)   Resp 20   Ht 1.727 m (5' 8\")   Wt 109 kg (241 lb)   SpO2 95%   BMI 36.64 kg/m²       Physical Exam  Vitals reviewed.   Constitutional:       Appearance: He is well-developed. He is obese.   HENT:      Head: Normocephalic and atraumatic.      Right Ear: Tympanic membrane, ear canal and external ear normal.      Left Ear: Tympanic membrane, ear canal and external ear normal.      Nose: Nose normal.      Mouth/Throat:      Mouth: Mucous membranes are moist.      Pharynx: Oropharynx is clear.   Eyes:      Extraocular Movements: Extraocular movements intact.      Conjunctiva/sclera: Conjunctivae normal.      Pupils: " Pupils are equal, round, and reactive to light.      Comments: Patient wears glasses   Cardiovascular:      Rate and Rhythm: Normal rate and regular rhythm.      Heart sounds: Normal heart sounds. No murmur heard.  Pulmonary:      Effort: Pulmonary effort is normal.      Breath sounds: Normal breath sounds. No wheezing.   Abdominal:      General: Abdomen is flat. Bowel sounds are normal.      Palpations: Abdomen is soft.   Genitourinary:     Penis: Normal.       Testes: Normal.   Musculoskeletal:         General: Normal range of motion.   Skin:     General: Skin is warm and dry.   Neurological:      General: No focal deficit present.      Mental Status: He is alert and oriented to person, place, and time. Mental status is at baseline.   Psychiatric:         Mood and Affect: Mood normal.         Behavior: Behavior normal.         Thought Content: Thought content normal.         Judgment: Judgment normal.       Assessment & Plan  Vitamin B12 deficiency    Orders:    CBC; Future    Vitamin B12; Future    Screening for metabolic disorder    Orders:    Comprehensive Metabolic Panel; Future    Hyperglycemia    Orders:    Hemoglobin A1C; Future    Encounter for hepatitis C screening test for low risk patient    Orders:    Hepatitis C Antibody; Future    Hyperlipidemia, mixed    Orders:    Lipid Panel; Future    Fatigue, unspecified type    Orders:    TSH with reflex to Free T4 if abnormal; Future    Vitamin D deficiency    Orders:    Vitamin D 25-Hydroxy,Total (for eval of Vitamin D levels); Future    Benign prostatic hyperplasia, unspecified whether lower urinary tract symptoms present    Orders:    Prostate Specific Antigen; Future    Routine general medical examination at a health care facility         Family history of diabetes mellitus (DM)         Obesity (BMI 35.0-39.9 without comorbidity)         Statin myopathy         Will contact patient with lab results when available.     Patient exhibiting no signs of  depression and does not need treatment at this time.  Medication list reconciled.  Thank you for visiting today!        Professional services: Some of this note was completed using Dragon voice technology and sometimes the software misinterprets words. This may include unintended errors with respect to translation of words, typographical errors or grammar errors which may not have been identified prior to finalization of the chart note. Please take this into account when reading the note. Thank you.

## 2025-07-15 LAB
25(OH)D3+25(OH)D2 SERPL-MCNC: 48 NG/ML (ref 30–100)
ALBUMIN SERPL-MCNC: 4.3 G/DL (ref 3.6–5.1)
ALP SERPL-CCNC: 72 U/L (ref 35–144)
ALT SERPL-CCNC: 35 U/L (ref 9–46)
ANION GAP SERPL CALCULATED.4IONS-SCNC: 9 MMOL/L (CALC) (ref 7–17)
AST SERPL-CCNC: 32 U/L (ref 10–35)
BILIRUB SERPL-MCNC: 1 MG/DL (ref 0.2–1.2)
BUN SERPL-MCNC: 20 MG/DL (ref 7–25)
CALCIUM SERPL-MCNC: 9.4 MG/DL (ref 8.6–10.3)
CHLORIDE SERPL-SCNC: 107 MMOL/L (ref 98–110)
CHOLEST SERPL-MCNC: 113 MG/DL
CHOLEST/HDLC SERPL: 3.4 (CALC)
CO2 SERPL-SCNC: 24 MMOL/L (ref 20–32)
CREAT SERPL-MCNC: 0.91 MG/DL (ref 0.7–1.35)
EGFRCR SERPLBLD CKD-EPI 2021: 92 ML/MIN/1.73M2
ERYTHROCYTE [DISTWIDTH] IN BLOOD BY AUTOMATED COUNT: 13.1 % (ref 11–15)
EST. AVERAGE GLUCOSE BLD GHB EST-MCNC: 134 MG/DL
EST. AVERAGE GLUCOSE BLD GHB EST-SCNC: 7.4 MMOL/L
GLUCOSE SERPL-MCNC: 116 MG/DL (ref 65–99)
HBA1C MFR BLD: 6.3 %
HCT VFR BLD AUTO: 47.2 % (ref 38.5–50)
HCV AB SERPL QL IA: NORMAL
HDLC SERPL-MCNC: 33 MG/DL
HGB BLD-MCNC: 15.7 G/DL (ref 13.2–17.1)
LDLC SERPL CALC-MCNC: 49 MG/DL (CALC)
MCH RBC QN AUTO: 31 PG (ref 27–33)
MCHC RBC AUTO-ENTMCNC: 33.3 G/DL (ref 32–36)
MCV RBC AUTO: 93.3 FL (ref 80–100)
NONHDLC SERPL-MCNC: 80 MG/DL (CALC)
PLATELET # BLD AUTO: 222 THOUSAND/UL (ref 140–400)
PMV BLD REES-ECKER: 11.2 FL (ref 7.5–12.5)
POTASSIUM SERPL-SCNC: 4.2 MMOL/L (ref 3.5–5.3)
PROT SERPL-MCNC: 7.1 G/DL (ref 6.1–8.1)
PSA SERPL-MCNC: 0.32 NG/ML
RBC # BLD AUTO: 5.06 MILLION/UL (ref 4.2–5.8)
SODIUM SERPL-SCNC: 140 MMOL/L (ref 135–146)
TRIGL SERPL-MCNC: 246 MG/DL
TSH SERPL-ACNC: 2.71 MIU/L (ref 0.4–4.5)
VIT B12 SERPL-MCNC: 478 PG/ML (ref 200–1100)
WBC # BLD AUTO: 8.5 THOUSAND/UL (ref 3.8–10.8)

## 2026-07-15 ENCOUNTER — APPOINTMENT (OUTPATIENT)
Dept: PRIMARY CARE | Facility: CLINIC | Age: 66
End: 2026-07-15
Payer: MEDICARE